# Patient Record
Sex: FEMALE | Race: WHITE | NOT HISPANIC OR LATINO | URBAN - METROPOLITAN AREA
[De-identification: names, ages, dates, MRNs, and addresses within clinical notes are randomized per-mention and may not be internally consistent; named-entity substitution may affect disease eponyms.]

---

## 2023-03-28 ENCOUNTER — OFFICE VISIT (OUTPATIENT)
Dept: PRIMARY CARE | Facility: CLINIC | Age: 58
End: 2023-03-28
Payer: COMMERCIAL

## 2023-03-28 VITALS — BODY MASS INDEX: 21.82 KG/M2 | DIASTOLIC BLOOD PRESSURE: 66 MMHG | WEIGHT: 135.2 LBS | SYSTOLIC BLOOD PRESSURE: 122 MMHG

## 2023-03-28 DIAGNOSIS — R22.0 LIP SWELLING: Primary | ICD-10-CM

## 2023-03-28 DIAGNOSIS — Z12.83 SCREENING EXAM FOR SKIN CANCER: ICD-10-CM

## 2023-03-28 PROCEDURE — 1036F TOBACCO NON-USER: CPT | Performed by: FAMILY MEDICINE

## 2023-03-28 PROCEDURE — 99213 OFFICE O/P EST LOW 20 MIN: CPT | Performed by: FAMILY MEDICINE

## 2023-03-28 RX ORDER — BETAMETHASONE DIPROPIONATE 0.5 MG/G
OINTMENT TOPICAL 2 TIMES DAILY PRN
Qty: 45 G | Refills: 0 | Status: SHIPPED | OUTPATIENT
Start: 2023-03-28 | End: 2023-07-26

## 2023-03-28 ASSESSMENT — PATIENT HEALTH QUESTIONNAIRE - PHQ9
1. LITTLE INTEREST OR PLEASURE IN DOING THINGS: NOT AT ALL
SUM OF ALL RESPONSES TO PHQ9 QUESTIONS 1 AND 2: 0
2. FEELING DOWN, DEPRESSED OR HOPELESS: NOT AT ALL

## 2023-03-28 NOTE — PATIENT INSTRUCTIONS
Zyrtec (certirizine) 10 mg daily  Use the Rx ointment 3 times a day  Okay to still use aquaphor often  Make sure toothpaste has no SLS  Call if not almost completely better by Monday.

## 2023-04-09 NOTE — PROGRESS NOTES
Subjective   Patient ID: Catherine Phillips is a 57 y.o. female who presents for Blister (Above and around lips for over 1 month and doesn't seem to get better ).    The symptoms have been variable, but never seem to go away.  She denies pain or significant itching but sometimes feels like the lesions itch a little bit.  At various times she also feels like her lips have been very swollen.  She is not sure what she may have come into contact with that she could be allergic to.  We reviewed a list of possibilities and nothing seemed like a new exposure or a consistent exposure.    Objective   /66   Wt 61.3 kg (135 lb 3.2 oz)   LMP  (LMP Unknown)   BMI 21.82 kg/m²     Physical Exam  Alert adult woman, no acute distress  Skin warm and dry, clear  Oropharynx clear  Lips appear normal with the exception of some very tiny papules at the vermilion border.  They appear almost vesicular but did not seem to have any fluid in them.  No significant erythema or discoloration    Assessment/Plan   Problem List Items Addressed This Visit    None  Visit Diagnoses       Lip swelling    -  Primary    Relevant Medications    betamethasone dipropionate (Diprolene) 0.05 % ointment    Screening exam for skin cancer        Relevant Orders    Referral to Dermatology          I am not sure what is causing the lip swelling.  I would like her to try a weeks worth of betamethasone topical and he is not effective let me know.  I also referred her to dermatology.

## 2023-06-13 ENCOUNTER — OFFICE VISIT (OUTPATIENT)
Dept: PRIMARY CARE | Facility: CLINIC | Age: 58
End: 2023-06-13
Payer: COMMERCIAL

## 2023-06-13 VITALS
HEART RATE: 77 BPM | DIASTOLIC BLOOD PRESSURE: 76 MMHG | SYSTOLIC BLOOD PRESSURE: 122 MMHG | BODY MASS INDEX: 21.47 KG/M2 | WEIGHT: 133 LBS

## 2023-06-13 DIAGNOSIS — N63.14 MASS OF LOWER INNER QUADRANT OF RIGHT BREAST: Primary | ICD-10-CM

## 2023-06-13 PROCEDURE — 1036F TOBACCO NON-USER: CPT | Performed by: FAMILY MEDICINE

## 2023-06-13 PROCEDURE — 99213 OFFICE O/P EST LOW 20 MIN: CPT | Performed by: FAMILY MEDICINE

## 2023-06-13 ASSESSMENT — PATIENT HEALTH QUESTIONNAIRE - PHQ9
1. LITTLE INTEREST OR PLEASURE IN DOING THINGS: NOT AT ALL
2. FEELING DOWN, DEPRESSED OR HOPELESS: NOT AT ALL
SUM OF ALL RESPONSES TO PHQ9 QUESTIONS 1 AND 2: 0

## 2023-06-13 NOTE — PROGRESS NOTES
Subjective   Patient ID: Katherine Phillips is a 57 y.o. female who presents for Breast Mass (Near right breast/Normal mammogram in December).  She first felt it 1 week ago, and it is a little tender.  Today she is having a harder time finding/feeling it.  No nipple discharge.  Normal mammogram 56 months ago.    Histories reviewed      Objective   /76   Pulse 77   Wt 60.3 kg (133 lb)   BMI 21.47 kg/m²    Physical Exam  Alert adult woman, NAD  Left breast WNL, no mass palpated, axilla negative  Right breast appears normal.  On initial exam no mass palpated but when I had pt sit up I could feel a very small nodule at 5 o'clock, freely moveable, less than 1 cm diameter  Right axilla negative    Problem List Items Addressed This Visit    None  Visit Diagnoses       Mass of lower inner quadrant of right breast    -  Primary    Relevant Orders    BI mammo right diagnostic    BI US breast complete right        I strongly suspect fibroglandular tissue but due to her age and persistent nodule will image.

## 2023-06-22 ENCOUNTER — TELEPHONE (OUTPATIENT)
Dept: PRIMARY CARE | Facility: CLINIC | Age: 58
End: 2023-06-22
Payer: COMMERCIAL

## 2023-06-22 NOTE — TELEPHONE ENCOUNTER
Type of form: Outside Lab Results    Received via: Fax    [facility]    Form placed: In your to review folder

## 2023-12-11 ENCOUNTER — OFFICE VISIT (OUTPATIENT)
Dept: PRIMARY CARE | Facility: CLINIC | Age: 58
End: 2023-12-11
Payer: COMMERCIAL

## 2023-12-11 VITALS
SYSTOLIC BLOOD PRESSURE: 112 MMHG | DIASTOLIC BLOOD PRESSURE: 68 MMHG | HEART RATE: 74 BPM | BODY MASS INDEX: 21.31 KG/M2 | HEIGHT: 66 IN | WEIGHT: 132.6 LBS

## 2023-12-11 DIAGNOSIS — Z00.00 WELL ADULT EXAM: Primary | ICD-10-CM

## 2023-12-11 DIAGNOSIS — N95.1 VAGINAL DRYNESS, MENOPAUSAL: ICD-10-CM

## 2023-12-11 DIAGNOSIS — Z12.31 ENCOUNTER FOR SCREENING MAMMOGRAM FOR MALIGNANT NEOPLASM OF BREAST: ICD-10-CM

## 2023-12-11 PROCEDURE — 99396 PREV VISIT EST AGE 40-64: CPT | Performed by: FAMILY MEDICINE

## 2023-12-11 PROCEDURE — 1036F TOBACCO NON-USER: CPT | Performed by: FAMILY MEDICINE

## 2023-12-11 RX ORDER — ESTRADIOL 0.1 MG/G
2 CREAM VAGINAL DAILY
Qty: 42.5 G | Refills: 5 | Status: SHIPPED | OUTPATIENT
Start: 2023-12-11 | End: 2024-12-10

## 2023-12-11 NOTE — PROGRESS NOTES
"  Subjective   Patient ID: Catherine Phillips \"Ravinder" is a 58 y.o. female who presents for Annual Exam (Patient is here for annual physical. She is worried that she is not sleeping well and has little to no libido due to menopause. She would also like to go over her lab work she had done I April, 2023.).      Past Medical, Surgical, Social and Family Hx reviewed-Yes    Any acute complaints?    above    Any chronic issues addressed today or Rx refills needed?        Last pap 2021 normal  Last Mammogram ordered, reviewed recent  Colon CA screening Hx 2022, needs every 5 years due to Fam Hx  Labs Reviewed 2023  Up to date on immunizations discussed, will consider flu and TDaP  Dentist in the past year Yes    Menstruation none  Sexual Activity  -she is doing some pelvic floor PT virtually though her insurance company.  She has low libido and the discomfort with sex is getting better.          PE:  /68   Pulse 74   Ht 1.681 m (5' 6.2\")   Wt 60.1 kg (132 lb 9.6 oz)   LMP  (LMP Unknown)   PF 98 L/min   BMI 21.27 kg/m²   Alert adult woman, NAD  HEENT clear  Neck supple, No LAD  Heart RRR no murmur  Lungs CTA bilat  Abdomen benign, normal BS, soft NT/ND  Skin warm, dry, clear  Neuro grossly normal, gait WNL  Affect pleasant and appropriate, memory and judgement WNL    Labs reviewed    Assessment/Plan   Problem List Items Addressed This Visit    None  Visit Diagnoses         Codes    Well adult exam    -  Primary Z00.00    Vaginal dryness, menopausal     N95.1    Relevant Medications    estradiol (Estrace) 0.01 % (0.1 mg/gram) vaginal cream    Encounter for screening mammogram for malignant neoplasm of breast     Z12.31    Relevant Orders    BI mammo bilateral screening tomosynthesis               "

## 2024-10-11 ENCOUNTER — HOSPITAL ENCOUNTER (OUTPATIENT)
Dept: RADIOLOGY | Facility: HOSPITAL | Age: 59
Discharge: HOME | End: 2024-10-11
Payer: COMMERCIAL

## 2024-10-11 VITALS — BODY MASS INDEX: 21.29 KG/M2 | WEIGHT: 132.5 LBS | HEIGHT: 66 IN

## 2024-10-11 DIAGNOSIS — Z12.31 ENCOUNTER FOR SCREENING MAMMOGRAM FOR MALIGNANT NEOPLASM OF BREAST: ICD-10-CM

## 2024-10-11 PROCEDURE — 77067 SCR MAMMO BI INCL CAD: CPT

## 2024-12-13 ENCOUNTER — APPOINTMENT (OUTPATIENT)
Dept: PRIMARY CARE | Facility: CLINIC | Age: 59
End: 2024-12-13
Payer: COMMERCIAL

## 2024-12-26 ENCOUNTER — APPOINTMENT (OUTPATIENT)
Dept: PRIMARY CARE | Facility: CLINIC | Age: 59
End: 2024-12-26
Payer: COMMERCIAL

## 2024-12-26 VITALS
WEIGHT: 134 LBS | SYSTOLIC BLOOD PRESSURE: 112 MMHG | BODY MASS INDEX: 21.53 KG/M2 | OXYGEN SATURATION: 98 % | HEART RATE: 73 BPM | HEIGHT: 66 IN | DIASTOLIC BLOOD PRESSURE: 76 MMHG

## 2024-12-26 DIAGNOSIS — N95.1 VAGINAL DRYNESS, MENOPAUSAL: ICD-10-CM

## 2024-12-26 DIAGNOSIS — Z00.00 WELL ADULT EXAM: Primary | ICD-10-CM

## 2024-12-26 DIAGNOSIS — Z13.1 SCREENING FOR DIABETES MELLITUS: ICD-10-CM

## 2024-12-26 DIAGNOSIS — Z13.220 SCREENING FOR HYPERLIPIDEMIA: ICD-10-CM

## 2024-12-26 DIAGNOSIS — N95.1 POST MENOPAUSAL SYNDROME: ICD-10-CM

## 2024-12-26 PROCEDURE — 3008F BODY MASS INDEX DOCD: CPT | Performed by: FAMILY MEDICINE

## 2024-12-26 PROCEDURE — 99213 OFFICE O/P EST LOW 20 MIN: CPT | Performed by: FAMILY MEDICINE

## 2024-12-26 PROCEDURE — 99396 PREV VISIT EST AGE 40-64: CPT | Performed by: FAMILY MEDICINE

## 2024-12-26 PROCEDURE — 1036F TOBACCO NON-USER: CPT | Performed by: FAMILY MEDICINE

## 2024-12-26 RX ORDER — PROGESTERONE 100 MG/1
CAPSULE ORAL
Qty: 42 CAPSULE | Refills: 1 | Status: SHIPPED | OUTPATIENT
Start: 2024-12-26

## 2024-12-26 RX ORDER — ESTRADIOL 0.1 MG/G
2 CREAM VAGINAL DAILY
Qty: 42.5 G | Refills: 5 | Status: CANCELLED | OUTPATIENT
Start: 2024-12-26 | End: 2025-12-26

## 2024-12-26 RX ORDER — ESTRADIOL 1 MG/1
1 TABLET ORAL DAILY
Qty: 90 TABLET | Refills: 1 | Status: SHIPPED | OUTPATIENT
Start: 2024-12-26 | End: 2025-12-26

## 2024-12-26 ASSESSMENT — PATIENT HEALTH QUESTIONNAIRE - PHQ9
2. FEELING DOWN, DEPRESSED OR HOPELESS: SEVERAL DAYS
1. LITTLE INTEREST OR PLEASURE IN DOING THINGS: NOT AT ALL
SUM OF ALL RESPONSES TO PHQ9 QUESTIONS 1 AND 2: 1

## 2024-12-26 NOTE — PROGRESS NOTES
"  Subjective   Patient ID: Catherine Phillips \"Katherine\" is a 59 y.o. female who presents for Annual Exam (Patient is here for annual physical. ).  She is under a lot of stress.  Her mother is slowly dying of dementia, not eating now. Also, one of her twins has significant mental health issues and is estranged.      Past Medical, Surgical, Social and Family Hx reviewed-Yes    Any acute complaints?    As above    Any chronic issues addressed today or Rx refills needed?    She never tried the topical estrogen, but she is very bothered by absent libido, fatigue, loss of muscle mass, and poor sleep    Last pap 2021  Last Mammogram 10/2024  Colon CA screening Hx up to date, 2021  Labs Reviewed 2023  Up to date on immunizations yes  Dentist in the past year yes    Menstruation not for over 5 years  Sexual Activity yes, but very low libido        PE:  /76   Pulse 73   Ht 1.676 m (5' 6\")   Wt 60.8 kg (134 lb)   SpO2 98%   BMI 21.63 kg/m²   Alert adult woman, NAD  HEENT clear  Neck supple, No LAD  Heart RRR no murmur  Lungs CTA bilat  Abdomen benign, normal BS, soft NT/ND  Skin warm, dry, clear  Neuro grossly normal, gait WNL  Affect pleasant and appropriate, memory and judgement WNL      Assessment/Plan   Assessment & Plan  Well adult exam  Reviewed HM and vaccines          Vaginal dryness, menopausal  Reviewed options, side effects  Orders:    estradiol (Estrace) 1 mg tablet; Take 1 tablet (1 mg) by mouth once daily.    progesterone (Prometrium) 100 mg capsule; 1 po daily days 1-14 each month    Screening for hyperlipidemia    Orders:    Lipid Panel; Future    Screening for diabetes mellitus    Orders:    Hemoglobin A1C; Future    Post menopausal syndrome    Orders:    estradiol (Estrace) 1 mg tablet; Take 1 tablet (1 mg) by mouth once daily.    progesterone (Prometrium) 100 mg capsule; 1 po daily days 1-14 each month           "

## 2024-12-31 ENCOUNTER — LAB (OUTPATIENT)
Dept: LAB | Facility: LAB | Age: 59
End: 2024-12-31
Payer: COMMERCIAL

## 2024-12-31 DIAGNOSIS — Z13.220 SCREENING FOR HYPERLIPIDEMIA: ICD-10-CM

## 2024-12-31 DIAGNOSIS — Z13.1 SCREENING FOR DIABETES MELLITUS: ICD-10-CM

## 2024-12-31 LAB
CHOLEST SERPL-MCNC: 202 MG/DL (ref 0–199)
CHOLESTEROL/HDL RATIO: 2.4
EST. AVERAGE GLUCOSE BLD GHB EST-MCNC: 103 MG/DL
HBA1C MFR BLD: 5.2 %
HDLC SERPL-MCNC: 84.4 MG/DL
LDLC SERPL CALC-MCNC: 106 MG/DL
NON HDL CHOLESTEROL: 118 MG/DL (ref 0–149)
TRIGL SERPL-MCNC: 56 MG/DL (ref 0–149)
VLDL: 11 MG/DL (ref 0–40)

## 2024-12-31 PROCEDURE — 80061 LIPID PANEL: CPT

## 2024-12-31 PROCEDURE — 83036 HEMOGLOBIN GLYCOSYLATED A1C: CPT

## 2025-04-25 ENCOUNTER — TELEPHONE (OUTPATIENT)
Dept: PRIMARY CARE | Facility: CLINIC | Age: 60
End: 2025-04-25

## 2025-04-25 NOTE — TELEPHONE ENCOUNTER
Patient called office requesting call back from PCP to discuss her hormone medication. She has some questions that she would not elaborate on the phone. Appt was advised but she declined stating that it wasn't worth the appt, she just had some questions before she refilled it.

## 2025-05-09 NOTE — PROGRESS NOTES
Called and talked with patient and discussed I think that it would be best if she make an appointment due to the nipple sensitivity. Could be from the Estradiol and maybe a lower dose may improve these symptoms, however I informed her that I think it would be best to come in and discuss with Dr. Carr.

## 2025-05-09 NOTE — TELEPHONE ENCOUNTER
Pt called again requesting to speak to PCP regarding hormone RX. I advised pt PCP was out until next week but I could send the message to NP instead to be addressed. Last OV 12.26.24. Wellness

## 2025-05-14 ENCOUNTER — APPOINTMENT (OUTPATIENT)
Dept: PRIMARY CARE | Facility: CLINIC | Age: 60
End: 2025-05-14
Payer: COMMERCIAL

## 2025-05-14 VITALS
BODY MASS INDEX: 21.56 KG/M2 | WEIGHT: 133.6 LBS | OXYGEN SATURATION: 99 % | DIASTOLIC BLOOD PRESSURE: 64 MMHG | SYSTOLIC BLOOD PRESSURE: 108 MMHG | HEART RATE: 81 BPM

## 2025-05-14 DIAGNOSIS — Z13.6 ENCOUNTER FOR SCREENING FOR CORONARY ARTERY DISEASE: ICD-10-CM

## 2025-05-14 DIAGNOSIS — L29.89 PRURITUS OF NIPPLE: Primary | ICD-10-CM

## 2025-05-14 PROCEDURE — 1036F TOBACCO NON-USER: CPT | Performed by: FAMILY MEDICINE

## 2025-05-14 PROCEDURE — 99213 OFFICE O/P EST LOW 20 MIN: CPT | Performed by: FAMILY MEDICINE

## 2025-05-23 ENCOUNTER — APPOINTMENT (OUTPATIENT)
Dept: PRIMARY CARE | Facility: CLINIC | Age: 60
End: 2025-05-23
Payer: COMMERCIAL

## 2025-05-26 NOTE — PROGRESS NOTES
"Subjective   Patient ID: Catherine Phillips \"Katherine\" is a 59 y.o. female who presents for Breast Problem (Tender breast and itchy nipples after  starting HRT, she has stopped taking these medications. She does have an appointment with OBGYN 6/4/25. ).  She says these side effects scared her so she stopped it right away.  The HRT was making her feel better so she is hoping the hormone specialist can review other options with her.      Histories reviewed      Objective   /64   Pulse 81   Wt 60.6 kg (133 lb 9.6 oz)   SpO2 99%   BMI 21.56 kg/m²    Physical Exam    Alert adult, NAD  Affect pleasant  Heart RRR no murmur  Lungs CTA bilat  Breasts appear normal, no rash, nipples clear    Assessment & Plan  Pruritus of nipple  Sxs are relenting though I suggested she use an OTC antihistamine such as zyrtec daily as needed and a fragrance free moisturizer       Encounter for screening for coronary artery disease  Discussed as an option and pt agreed.  Orders:    CT cardiac scoring wo IV contrast; Future      "

## 2025-06-04 ENCOUNTER — APPOINTMENT (OUTPATIENT)
Dept: OBSTETRICS AND GYNECOLOGY | Facility: CLINIC | Age: 60
End: 2025-06-04
Payer: COMMERCIAL

## 2025-06-04 VITALS
SYSTOLIC BLOOD PRESSURE: 110 MMHG | HEIGHT: 66 IN | WEIGHT: 134 LBS | BODY MASS INDEX: 21.53 KG/M2 | DIASTOLIC BLOOD PRESSURE: 60 MMHG

## 2025-06-04 DIAGNOSIS — N95.1 MENOPAUSAL SYMPTOMS: Primary | ICD-10-CM

## 2025-06-04 PROCEDURE — 3008F BODY MASS INDEX DOCD: CPT | Performed by: OBSTETRICS & GYNECOLOGY

## 2025-06-04 PROCEDURE — 1036F TOBACCO NON-USER: CPT | Performed by: OBSTETRICS & GYNECOLOGY

## 2025-06-04 PROCEDURE — 99203 OFFICE O/P NEW LOW 30 MIN: CPT | Performed by: OBSTETRICS & GYNECOLOGY

## 2025-06-04 RX ORDER — ESTRADIOL 10 UG/1
10 TABLET, FILM COATED VAGINAL 2 TIMES WEEKLY
Qty: 18 TABLET | Refills: 3 | Status: SHIPPED | OUTPATIENT
Start: 2025-06-05

## 2025-06-04 ASSESSMENT — PAIN SCALES - GENERAL: PAINLEVEL_OUTOF10: 0-NO PAIN

## 2025-06-04 NOTE — PROGRESS NOTES
"Subjective   Patient ID: Catherine Phillips 59 y.o. who presents for menopausal concerns she reports vaginal dryness, brain fog, low labido for about a year. Her PCP started her on HRT (estradiol and progesterone) in January which helped with her symptoms. She states that after being on her HRT for a few months she noticed nipple tenderness and pruritic. She discontinued her HRT mid April. She reports her menopausal symptoms have continued to be well controlled. She has not tried using estradiol vaginal cream.    Chief Complaint   Patient presents with    New Patient Visit     NPV=  Menopause    LAST PAP: 10/2021- WNL -HPV  LAST MAMM:  10/2024    Chaperone Declined: FATOU Thomas          OB History    Para Term  AB Living   5 2 1 1 3    SAB IAB Ectopic Multiple Live Births   1 1 1        # Outcome Date GA Lbr Lucien/2nd Weight Sex Type Anes PTL Lv   5 Ectopic            4 SAB            3 IAB            2             1 Term              Objective   /60   Ht 1.676 m (5' 6\")   Wt 60.8 kg (134 lb)   BMI 21.63 kg/m²   Physical Exam  Constitutional:       Appearance: Normal appearance.   HENT:      Head: Normocephalic and atraumatic.      Mouth/Throat:      Mouth: Mucous membranes are moist.      Pharynx: Oropharynx is clear.   Eyes:      Extraocular Movements: Extraocular movements intact.      Pupils: Pupils are equal, round, and reactive to light.   Cardiovascular:      Rate and Rhythm: Normal rate and regular rhythm.      Pulses: Normal pulses.      Heart sounds: Normal heart sounds.   Pulmonary:      Effort: Pulmonary effort is normal.      Breath sounds: Normal breath sounds.   Abdominal:      General: Abdomen is flat. Bowel sounds are normal.   Neurological:      General: No focal deficit present.      Mental Status: She is alert and oriented to person, place, and time.   Psychiatric:         Mood and Affect: Mood normal.         Judgment: Judgment normal.   Vitals reviewed. "        Assessment and Plan:  Problem List Items Addressed This Visit          Genitourinary and Reproductive    Menopausal symptoms - Primary    Current Assessment & Plan   - Discussed symptoms of menopause different tx SE and contraindications as well as the different recommendations when treating for low labido. Will plan to restart on a lower oral dose of the estradiol with the progesterone and may switch to using the patch if symptoms remain uncontrolled  - Discussed using vaginal suppository if having dyspareunia            Relevant Medications    estradiol (Vagifem) 10 mcg tablet vaginal tablet (Start on 6/5/2025)      No orders of the defined types were placed in this encounter.     RTC as needed  Pt seen and d/w attending Dr. Griffin Craig MD   Family Medicine, PGY2

## 2025-06-04 NOTE — PATIENT INSTRUCTIONS
https://menopause.org/#:~:text=The%20evolution%20of%20women's%20healthcare,the%20menopause%20transition%20and%20beyond.

## 2025-06-04 NOTE — ASSESSMENT & PLAN NOTE
- Discussed symptoms of menopause different tx SE and contraindications as well as the different recommendations when treating for low labido. Will plan to restart on a lower oral dose of the estradiol with the progesterone and may switch to using the patch if symptoms remain uncontrolled  - Discussed using vaginal suppository if having dyspareunia

## 2025-06-04 NOTE — PROGRESS NOTES
I saw and evaluated the patient. I personally obtained the key and critical portions of the history and physical exam or was physically present for key and critical portions performed by the resident/fellow. I reviewed the resident/fellow's documentation and discussed the patient with the resident/fellow. I agree with the resident/fellow's medical decision making as documented in the note.    Briana Moya MD

## 2025-06-11 ENCOUNTER — APPOINTMENT (OUTPATIENT)
Dept: RADIOLOGY | Facility: HOSPITAL | Age: 60
End: 2025-06-11
Payer: COMMERCIAL

## 2025-07-21 ENCOUNTER — APPOINTMENT (OUTPATIENT)
Dept: RADIOLOGY | Facility: HOSPITAL | Age: 60
End: 2025-07-21
Payer: COMMERCIAL

## 2025-07-21 DIAGNOSIS — Z13.6 ENCOUNTER FOR SCREENING FOR CORONARY ARTERY DISEASE: ICD-10-CM

## 2025-07-21 PROCEDURE — 75571 CT HRT W/O DYE W/CA TEST: CPT

## 2025-12-29 ENCOUNTER — APPOINTMENT (OUTPATIENT)
Dept: PRIMARY CARE | Facility: CLINIC | Age: 60
End: 2025-12-29
Payer: COMMERCIAL